# Patient Record
(demographics unavailable — no encounter records)

---

## 2025-03-21 NOTE — ASSESSMENT
[FreeTextEntry1] : Patient with a history of kidney transplant recently has been having diminished hearing denies any vertigo or any dizziness he does use Q-tips on examination there is no wax in his left ear there is some mild of wax in his right ear which was curetted out but not the etiology of his diminished hearing audiogram was performed confirm sensorineural hearing loss he was referred [facility] for evaluation for hearing aids.

## 2025-03-21 NOTE — PHYSICAL EXAM
[Midline] : trachea located in midline position [Normal] : no rashes [de-identified] : cerumen removed from the right ear canal with curette once removed, normal ear canal

## 2025-03-21 NOTE — REVIEW OF SYSTEMS
[As Noted in HPI] : as noted in HPI [Snoring With Pauses] : snoring with pauses [Negative] : Heme/Lymph

## 2025-03-21 NOTE — END OF VISIT
[FreeTextEntry3] : I, Dr. Rubalcava personally performed the evaluation and management (E/M) services , including all procedures, for this established patient who presents today with (a) new problem(s)/exacerbation of (an) existing condition(s). That E/M includes conducting the clinically appropriate interval history &/or exam, assessing all new/exacerbated conditions, and establishing a new plan of care. Today, my MIRA, Gloria Paris, was here to observe &/or participate in the visit & follow plan of care established by me.

## 2025-03-21 NOTE — HISTORY OF PRESENT ILLNESS
[de-identified] : Patient comes back about 2 and a half years later with ear  clogging, mostly on the right. He does not have any issues with noises in the ears. No pain in the ears. He does not have any issues with pain in the ears  he does use Qtips.  No nasal congestion issues. He does use a CPAP for SKIP